# Patient Record
Sex: MALE | Race: WHITE
[De-identification: names, ages, dates, MRNs, and addresses within clinical notes are randomized per-mention and may not be internally consistent; named-entity substitution may affect disease eponyms.]

---

## 2018-02-02 ENCOUNTER — HOSPITAL ENCOUNTER (OUTPATIENT)
Dept: HOSPITAL 6 - CARDLAB | Age: 68
End: 2018-02-02
Attending: FAMILY MEDICINE
Payer: MEDICARE

## 2018-02-02 DIAGNOSIS — R06.83: ICD-10-CM

## 2018-02-02 DIAGNOSIS — I10: Primary | ICD-10-CM

## 2018-02-02 DIAGNOSIS — E78.5: ICD-10-CM

## 2018-02-02 DIAGNOSIS — Z00.00: ICD-10-CM

## 2018-02-02 DIAGNOSIS — Z13.6: ICD-10-CM

## 2018-02-02 DIAGNOSIS — E78.1: ICD-10-CM

## 2018-02-02 DIAGNOSIS — Z87.891: ICD-10-CM

## 2018-02-02 DIAGNOSIS — Z82.49: ICD-10-CM

## 2018-02-02 PROCEDURE — A9500 TC99M SESTAMIBI: HCPCS

## 2019-10-24 ENCOUNTER — HOSPITAL ENCOUNTER (OUTPATIENT)
Dept: HOSPITAL 6 - MSO | Age: 69
Discharge: HOME | End: 2019-10-24
Payer: MEDICARE

## 2019-10-24 DIAGNOSIS — Z84.1: ICD-10-CM

## 2019-10-24 DIAGNOSIS — M17.10: ICD-10-CM

## 2019-10-24 DIAGNOSIS — R06.83: ICD-10-CM

## 2019-10-24 DIAGNOSIS — Z83.79: ICD-10-CM

## 2019-10-24 DIAGNOSIS — R09.02: ICD-10-CM

## 2019-10-24 DIAGNOSIS — C18.7: Primary | ICD-10-CM

## 2019-10-24 DIAGNOSIS — Z87.891: ICD-10-CM

## 2019-10-24 DIAGNOSIS — E78.1: ICD-10-CM

## 2019-10-24 DIAGNOSIS — R01.1: ICD-10-CM

## 2019-10-29 ENCOUNTER — HOSPITAL ENCOUNTER (INPATIENT)
Dept: HOSPITAL 19 - INPTSU | Age: 69
LOS: 17 days | Discharge: HOME | DRG: 331 | End: 2019-11-15
Attending: SURGERY | Admitting: SURGERY
Payer: MEDICARE

## 2019-10-29 VITALS — WEIGHT: 220.68 LBS | HEIGHT: 70 IN | BODY MASS INDEX: 31.59 KG/M2

## 2019-10-29 DIAGNOSIS — I10: ICD-10-CM

## 2019-10-29 DIAGNOSIS — C18.7: Primary | ICD-10-CM

## 2019-10-29 PROCEDURE — A9284 NON-ELECTRONIC SPIROMETER: HCPCS

## 2019-10-29 PROCEDURE — A4314 CATH W/DRAINAGE 2-WAY LATEX: HCPCS

## 2019-10-31 ENCOUNTER — HOSPITAL ENCOUNTER (OUTPATIENT)
Dept: HOSPITAL 6 - RAD | Age: 69
End: 2019-10-31
Payer: MEDICARE

## 2019-10-31 DIAGNOSIS — K63.89: Primary | ICD-10-CM

## 2019-10-31 DIAGNOSIS — Z85.038: ICD-10-CM

## 2019-11-13 VITALS — SYSTOLIC BLOOD PRESSURE: 149 MMHG | DIASTOLIC BLOOD PRESSURE: 79 MMHG | TEMPERATURE: 98.4 F | HEART RATE: 82 BPM

## 2019-11-13 VITALS — SYSTOLIC BLOOD PRESSURE: 153 MMHG | DIASTOLIC BLOOD PRESSURE: 76 MMHG | HEART RATE: 81 BPM

## 2019-11-13 VITALS — DIASTOLIC BLOOD PRESSURE: 82 MMHG | HEART RATE: 79 BPM | TEMPERATURE: 98.1 F | SYSTOLIC BLOOD PRESSURE: 151 MMHG

## 2019-11-13 VITALS — DIASTOLIC BLOOD PRESSURE: 79 MMHG | HEART RATE: 83 BPM | SYSTOLIC BLOOD PRESSURE: 152 MMHG

## 2019-11-13 VITALS — DIASTOLIC BLOOD PRESSURE: 71 MMHG | SYSTOLIC BLOOD PRESSURE: 122 MMHG | TEMPERATURE: 97.9 F | HEART RATE: 93 BPM

## 2019-11-13 VITALS — HEART RATE: 93 BPM | DIASTOLIC BLOOD PRESSURE: 71 MMHG | SYSTOLIC BLOOD PRESSURE: 122 MMHG | TEMPERATURE: 97.9 F

## 2019-11-13 VITALS — DIASTOLIC BLOOD PRESSURE: 78 MMHG | SYSTOLIC BLOOD PRESSURE: 156 MMHG | TEMPERATURE: 98.2 F | HEART RATE: 83 BPM

## 2019-11-13 VITALS — HEART RATE: 75 BPM | DIASTOLIC BLOOD PRESSURE: 80 MMHG | SYSTOLIC BLOOD PRESSURE: 147 MMHG

## 2019-11-13 VITALS — DIASTOLIC BLOOD PRESSURE: 81 MMHG | HEART RATE: 84 BPM | SYSTOLIC BLOOD PRESSURE: 152 MMHG

## 2019-11-13 VITALS — TEMPERATURE: 98 F | DIASTOLIC BLOOD PRESSURE: 87 MMHG | SYSTOLIC BLOOD PRESSURE: 156 MMHG | HEART RATE: 79 BPM

## 2019-11-13 VITALS — TEMPERATURE: 98.4 F | HEART RATE: 75 BPM | SYSTOLIC BLOOD PRESSURE: 151 MMHG | DIASTOLIC BLOOD PRESSURE: 77 MMHG

## 2019-11-13 VITALS — HEART RATE: 86 BPM | DIASTOLIC BLOOD PRESSURE: 71 MMHG | SYSTOLIC BLOOD PRESSURE: 141 MMHG | TEMPERATURE: 97.9 F

## 2019-11-13 VITALS — DIASTOLIC BLOOD PRESSURE: 82 MMHG | SYSTOLIC BLOOD PRESSURE: 156 MMHG | HEART RATE: 79 BPM

## 2019-11-13 PROCEDURE — 8E0W4CZ ROBOTIC ASSISTED PROCEDURE OF TRUNK REGION, PERCUTANEOUS ENDOSCOPIC APPROACH: ICD-10-PCS | Performed by: SURGERY

## 2019-11-13 PROCEDURE — 0DTN4ZZ RESECTION OF SIGMOID COLON, PERCUTANEOUS ENDOSCOPIC APPROACH: ICD-10-PCS | Performed by: SURGERY

## 2019-11-13 NOTE — NUR
Patient is back from surgery. He is drowsy. His wife is at bedside. Denies
nausea and pain at this time. Discussed ERAS protocol. Explained diet.
Oriented patient to room. Wife at bedside. No other changes at this time. Call
light within reach.

## 2019-11-13 NOTE — NUR
Patient is doing well. He is tolerating clear liquids. He has got up and
walked to the hallway. Denies nausea. Stated that his pain is about an 8 when
he sits up. Stated it is better since getting ultram. Denies nausea. No other
changes at this time. Call light within reach.

## 2019-11-13 NOTE — NUR
Report received. Assumed care for night shift. A&Ox3-drowsy. Assessment
complete. VS stable. LAPSx5-four with ang set-edges well approximated/1
with gauze-C/D/I. Denies need for pain medication-received tylenol at 1830.
Denies nausea/shortness of breath. Up ambulating in hallway. Left hand INT
flushes without difficulty. Tolerating diet/voiding. Denies passing gas. PLan
of care discussed for this shift to include pain control/ERAS protocol.
Verbalizes understanding-denies questions. Call light in reach/bed in
low/wheels locked. Will monitor.

## 2019-11-14 VITALS — SYSTOLIC BLOOD PRESSURE: 132 MMHG | TEMPERATURE: 97.9 F | HEART RATE: 84 BPM | DIASTOLIC BLOOD PRESSURE: 75 MMHG

## 2019-11-14 VITALS — HEART RATE: 70 BPM | SYSTOLIC BLOOD PRESSURE: 149 MMHG | TEMPERATURE: 98 F | DIASTOLIC BLOOD PRESSURE: 45 MMHG

## 2019-11-14 VITALS — TEMPERATURE: 97.1 F | SYSTOLIC BLOOD PRESSURE: 160 MMHG | HEART RATE: 98 BPM | DIASTOLIC BLOOD PRESSURE: 79 MMHG

## 2019-11-14 VITALS — TEMPERATURE: 97.5 F | DIASTOLIC BLOOD PRESSURE: 87 MMHG | SYSTOLIC BLOOD PRESSURE: 154 MMHG | HEART RATE: 85 BPM

## 2019-11-14 VITALS — SYSTOLIC BLOOD PRESSURE: 149 MMHG | DIASTOLIC BLOOD PRESSURE: 73 MMHG | HEART RATE: 84 BPM | TEMPERATURE: 97.8 F

## 2019-11-14 VITALS — SYSTOLIC BLOOD PRESSURE: 146 MMHG | DIASTOLIC BLOOD PRESSURE: 72 MMHG | TEMPERATURE: 97.9 F | HEART RATE: 79 BPM

## 2019-11-14 LAB
ANION GAP SERPL CALC-SCNC: 10 MMOL/L (ref 7–16)
BUN SERPL-MCNC: 15 MG/DL (ref 9–20)
CALCIUM SERPL-MCNC: 9.1 MG/DL (ref 8.4–10.2)
CHLORIDE SERPL-SCNC: 101 MMOL/L (ref 98–107)
CO2 SERPL-SCNC: 25 MMOL/L (ref 22–30)
CREAT SERPL-SCNC: 0.85 UMOL/L (ref 0.66–1.25)
GLUCOSE SERPL-MCNC: 99 MG/DL (ref 74–106)
HCT VFR BLD AUTO: 34.3 % (ref 42–52)
HGB BLD-MCNC: 10.5 G/DL (ref 13.5–18)
MAGNESIUM SERPL-MCNC: 1.9 MG/DL (ref 1.6–2.3)
PHOSPHATE SERPL-MCNC: 4 MG/DL (ref 2.5–4.5)
POTASSIUM SERPL-SCNC: 4.2 MMOL/L (ref 3.4–5)
SODIUM SERPL-SCNC: 136 MMOL/L (ref 137–145)

## 2019-11-14 NOTE — NUR
Has rested well this shift. Ambulating in hallways, tolerating PO, pain
controlled with scheduled tylenol/tramadol PRN. Rating pain this AM 1/10 to
abdomen-described as ache. Denies need for pain intervention. Denies
nausea/shortness of breath. Passing gas. Encouraged to call for
questions/concerns. Verbalizes understanding. Will monitor.

## 2019-11-14 NOTE — NUR
YANELIS met with the patient, patient's wife (Meagan, ph#[phone]), and daughter
(Jose) to discuss discharge plan. The patient lives in [place] with his wife.
He reports independence with ADLs and has canes. The patient's PCP is Dr. Andrew Brink and he receives his medications at [facility]. He reports no
difficulties obtaining his meds. The patient does not have advanced directives
in EMR, but he states that he believes he has them completed. He states that
his wife would be his DPOA-HC. The patient plans to return home upon
discharge.
 
The patient's wife and daughter then asked to speak to SW alone. The patient's
wife and daughter expressed their concerns with the patient's depression and
asked about caregiver support groups for cancer. They state that the patient
does not like to speak about his depression and wondered if the physician
could address it. YNAELIS informed the patient's nurse of the above information. YANELIS
then contacted [employer] to locate any local caregiver support
groups. The representative reports that there is none local, but there is
online resoures. The representative provided me with [email or web address]
and [email or web address]. SW provided that information to the patient's
wife. No other additional needs at this time.

## 2019-11-14 NOTE — NUR
Pt tolerating activity well this shift as was admitted for a robotic sigmoid
colectomy with low pevlic anatomosis.
Pt had x1 bowel movement this shift.
Pt does c/o pain when rising from sitting to standing position and laying to
sitting postion. States when he is up ambulating or sitting up, the pain is
bareable and rates it a 4 at this time. Pt did go from clear liquid diet to
blended foods this shift and is tolerating well. Pt's wife and daughter have
been at bedside today and did bring him chewing gum which he is compliant with
chewing at this time.

## 2019-11-14 NOTE — NUR
Pt assessment completed this shift by this student RN. Patient noted to have 5
lap sites. 4 are MARTA with no drainage or swelling present. 1 dressing remains
to mid lower abdomen. Dressing remains CDI at this time. Pt abdomen is round
and hypoactive BS x 4 quadrants. Pt remains on clear liquid diet, tolerating
well. Pt was able to have liquid bowel movement this morning. States still
unable to pass gas.

## 2019-11-14 NOTE — NUR
Rating pain 3/10 to abdomen-constant ache-worsens with movement. Tramadol give
per dr order. Denies nausea. Will monitor.

## 2019-11-15 VITALS — SYSTOLIC BLOOD PRESSURE: 123 MMHG | DIASTOLIC BLOOD PRESSURE: 69 MMHG | TEMPERATURE: 97.9 F | HEART RATE: 67 BPM

## 2019-11-15 VITALS — DIASTOLIC BLOOD PRESSURE: 70 MMHG | SYSTOLIC BLOOD PRESSURE: 126 MMHG | TEMPERATURE: 97.1 F | HEART RATE: 68 BPM

## 2019-11-15 VITALS — SYSTOLIC BLOOD PRESSURE: 123 MMHG | HEART RATE: 75 BPM | DIASTOLIC BLOOD PRESSURE: 63 MMHG | TEMPERATURE: 97.8 F

## 2019-11-15 VITALS — DIASTOLIC BLOOD PRESSURE: 70 MMHG | HEART RATE: 71 BPM | SYSTOLIC BLOOD PRESSURE: 135 MMHG | TEMPERATURE: 98.3 F

## 2019-11-15 NOTE — NUR
full assessment completed, have reviewed assessment completed by student and
in agreement with that assessment

## 2019-11-15 NOTE — NUR
Pt assessed this shift by this student nurse. Pt c/o pain at a 1 when resting
in bed, but states upon movement such as going from a laying to sitting
positiong, his pain jumps to a 5. Pt denies pain medicine at this time. Pt
educated on compliance with ambulation.
Pt up ambulating
independently in the hallway this morning. Pt advanced to a regular
diet and is tolerating well. Pt states he has had x1 bowel movement this
morning and denies the appearance of blood in his stool. Pt states stool is
more formed and brown in color. Pt educated about chewing gum.

## 2019-11-15 NOTE — NUR
Patient has rested well througout the night. Transverse incision and lap sites
have no drainage. Patient states pain 2/10 when lying in bed, but when sitting
up and ambulating to the restroom it jumps to an 8/10. PRN Tramadol given.
Noted to be effective. Patient denies any further needs. Will continue to
monitor.

## 2019-11-15 NOTE — NUR
resting in bed, denies needs, states has ambulated in guido and tolerates well,
will plan discharge later today

## 2020-11-30 ENCOUNTER — HOSPITAL ENCOUNTER (OUTPATIENT)
Dept: HOSPITAL 6 - LAB | Age: 70
End: 2020-11-30
Attending: INTERNAL MEDICINE
Payer: MEDICARE

## 2020-11-30 DIAGNOSIS — Z20.828: Primary | ICD-10-CM

## 2020-12-03 ENCOUNTER — HOSPITAL ENCOUNTER (OUTPATIENT)
Dept: HOSPITAL 6 - MSO | Age: 70
End: 2020-12-03
Payer: MEDICARE

## 2020-12-03 DIAGNOSIS — D12.5: ICD-10-CM

## 2020-12-03 DIAGNOSIS — I10: ICD-10-CM

## 2020-12-03 DIAGNOSIS — D12.0: ICD-10-CM

## 2020-12-03 DIAGNOSIS — Z98.61: ICD-10-CM

## 2020-12-03 DIAGNOSIS — D12.8: ICD-10-CM

## 2020-12-03 DIAGNOSIS — Z90.49: ICD-10-CM

## 2020-12-03 DIAGNOSIS — M19.90: ICD-10-CM

## 2020-12-03 DIAGNOSIS — Z12.11: Primary | ICD-10-CM

## 2020-12-03 DIAGNOSIS — Z85.038: ICD-10-CM

## 2021-02-02 ENCOUNTER — HOSPITAL ENCOUNTER (OUTPATIENT)
Dept: HOSPITAL 6 - RAD | Age: 71
End: 2021-02-02
Attending: INTERNAL MEDICINE
Payer: MEDICARE

## 2021-02-02 DIAGNOSIS — C18.9: Primary | ICD-10-CM

## 2021-02-02 DIAGNOSIS — Z98.890: ICD-10-CM

## 2021-09-06 ENCOUNTER — HOSPITAL ENCOUNTER (EMERGENCY)
Dept: HOSPITAL 6 - ED | Age: 71
Discharge: ANOTHER HOSPITAL | End: 2021-09-06
Payer: MEDICARE

## 2021-09-06 VITALS — SYSTOLIC BLOOD PRESSURE: 139 MMHG | DIASTOLIC BLOOD PRESSURE: 93 MMHG

## 2021-09-06 DIAGNOSIS — Z79.899: ICD-10-CM

## 2021-09-06 DIAGNOSIS — K85.90: Primary | ICD-10-CM

## 2021-09-06 DIAGNOSIS — Z85.038: ICD-10-CM

## 2021-09-06 DIAGNOSIS — I10: ICD-10-CM

## 2021-09-06 LAB
ALBUMIN SERPL-MCNC: 3.5 G/DL (ref 3.4–4.8)
ALT SERPL-CCNC: 28 U/L (ref 0–55)
AST SERPL-CCNC: 26 U/L (ref 5–34)
BILIRUB SERPL-MCNC: 1.7 MG/DL (ref 0.2–1.2)
CALCIUM SERPL-MCNC: 9 MG/DL (ref 8.3–10.5)
CO2 SERPL-SCNC: 18 MMOL/L (ref 23–31)
ERYTHROCYTE [DISTWIDTH] IN BLOOD BY AUTOMATED COUNT: 13.3 % (ref 11.5–14.5)
GLUCOSE SERPL-MCNC: 159 MG/DL (ref 75–110)
HCT VFR BLD AUTO: 55.3 % (ref 42–52)
HGB BLD-MCNC: 19.1 G/DL (ref 13.5–18)
LIPASE SERPL-CCNC: 1504 U/L (ref 8–78)
LYMPHOCYTES NFR BLD MANUAL: 0 % (ref 20–51)
MCH RBC QN AUTO: 32 PG (ref 27–31)
MCHC RBC AUTO-ENTMCNC: 35 G/DL (ref 33–37)
MCV RBC AUTO: 94 FL (ref 78–100)
MONOCYTES NFR BLD: 10 % (ref 3–10)
NEUTS BAND NFR BLD: 8 % (ref 0–10)
NEUTS SEG NFR BLD MANUAL: 82 % (ref 42–75)
PLATELET # BLD AUTO: 189 K/MM3 (ref 130–400)
PMV BLD AUTO: 9.5 FL (ref 7.4–10.4)
POTASSIUM SERPL-SCNC: 4.4 MMOL/L (ref 3.5–5.1)
PROT SERPL-MCNC: 6.6 G/DL (ref 6.2–8.1)
RBC # BLD AUTO: 5.91 M/MM3 (ref 4.2–5.6)
SODIUM SERPL-SCNC: 138 MMOL/L (ref 136–145)
WBC # BLD AUTO: 25.9 K/MM3 (ref 4.8–10.8)

## 2021-10-29 ENCOUNTER — HOSPITAL ENCOUNTER (OUTPATIENT)
Dept: HOSPITAL 6 - RAD | Age: 71
End: 2021-10-29
Attending: FAMILY MEDICINE
Payer: MEDICARE

## 2021-10-29 DIAGNOSIS — M25.512: Primary | ICD-10-CM

## 2021-12-30 ENCOUNTER — HOSPITAL ENCOUNTER (OUTPATIENT)
Dept: HOSPITAL 6 - RAD | Age: 71
End: 2021-12-30
Attending: FAMILY MEDICINE
Payer: MEDICARE

## 2021-12-30 DIAGNOSIS — M75.52: ICD-10-CM

## 2021-12-30 DIAGNOSIS — M75.102: Primary | ICD-10-CM

## 2022-02-07 ENCOUNTER — HOSPITAL ENCOUNTER (OUTPATIENT)
Dept: HOSPITAL 6 - RAD | Age: 72
End: 2022-02-07
Attending: INTERNAL MEDICINE
Payer: MEDICARE

## 2022-02-07 DIAGNOSIS — Z98.890: ICD-10-CM

## 2022-02-07 DIAGNOSIS — C18.9: Primary | ICD-10-CM

## 2022-02-22 ENCOUNTER — HOSPITAL ENCOUNTER (OUTPATIENT)
Dept: HOSPITAL 6 - VAS | Age: 72
End: 2022-02-22
Payer: MEDICARE

## 2022-02-22 DIAGNOSIS — I48.0: Primary | ICD-10-CM

## 2022-03-25 ENCOUNTER — HOSPITAL ENCOUNTER (OUTPATIENT)
Dept: HOSPITAL 6 - CARDREHAB | Age: 72
End: 2022-03-25
Payer: MEDICARE

## 2022-03-25 DIAGNOSIS — I48.0: Primary | ICD-10-CM

## 2022-03-25 PROCEDURE — A9500 TC99M SESTAMIBI: HCPCS

## 2024-07-30 ENCOUNTER — HOSPITAL ENCOUNTER (OUTPATIENT)
Dept: HOSPITAL 6 - RAD | Age: 74
End: 2024-07-30
Payer: MEDICARE

## 2024-07-30 DIAGNOSIS — C18.7: Primary | ICD-10-CM

## 2024-07-30 DIAGNOSIS — Z98.890: ICD-10-CM

## 2025-01-30 ENCOUNTER — HOSPITAL ENCOUNTER (OUTPATIENT)
Dept: HOSPITAL 6 - RAD | Age: 75
End: 2025-01-30
Payer: MEDICARE

## 2025-01-30 DIAGNOSIS — Z90.49: ICD-10-CM

## 2025-01-30 DIAGNOSIS — Z85.038: ICD-10-CM

## 2025-01-30 DIAGNOSIS — K63.89: Primary | ICD-10-CM
